# Patient Record
Sex: FEMALE | Race: WHITE
[De-identification: names, ages, dates, MRNs, and addresses within clinical notes are randomized per-mention and may not be internally consistent; named-entity substitution may affect disease eponyms.]

---

## 2022-12-30 VITALS
HEART RATE: 78 BPM | WEIGHT: 147.6 LBS | DIASTOLIC BLOOD PRESSURE: 56 MMHG | SYSTOLIC BLOOD PRESSURE: 84 MMHG | TEMPERATURE: 97.9 F | RESPIRATION RATE: 15 BRPM | OXYGEN SATURATION: 99 %

## 2023-04-03 ENCOUNTER — NON-APPOINTMENT (OUTPATIENT)
Age: 43
End: 2023-04-03

## 2023-04-03 DIAGNOSIS — Z98.890 OTHER SPECIFIED POSTPROCEDURAL STATES: ICD-10-CM

## 2023-04-03 DIAGNOSIS — Z82.49 FAMILY HISTORY OF ISCHEMIC HEART DISEASE AND OTHER DISEASES OF THE CIRCULATORY SYSTEM: ICD-10-CM

## 2023-04-03 DIAGNOSIS — F31.9 BIPOLAR DISORDER, UNSPECIFIED: ICD-10-CM

## 2023-04-03 DIAGNOSIS — Z78.9 OTHER SPECIFIED HEALTH STATUS: ICD-10-CM

## 2023-04-03 PROBLEM — Z00.00 ENCOUNTER FOR PREVENTIVE HEALTH EXAMINATION: Status: ACTIVE | Noted: 2023-04-03

## 2023-04-03 RX ORDER — NORETHINDRONE ACETATE AND ETHINYL ESTRADIOL, ETHINYL ESTRADIOL AND FERROUS FUMARATE 1MG-10(24)
1 MG-10 MCG / KIT ORAL DAILY
Refills: 0 | Status: ACTIVE | COMMUNITY

## 2023-04-03 RX ORDER — NALTREXONE HYDROCHLORIDE 50 MG/1
50 TABLET, FILM COATED ORAL DAILY
Refills: 0 | Status: ACTIVE | COMMUNITY

## 2023-04-21 ENCOUNTER — APPOINTMENT (OUTPATIENT)
Dept: INTERNAL MEDICINE | Facility: CLINIC | Age: 43
End: 2023-04-21
Payer: SELF-PAY

## 2023-04-21 ENCOUNTER — LABORATORY RESULT (OUTPATIENT)
Age: 43
End: 2023-04-21

## 2023-04-21 VITALS
BODY MASS INDEX: 23.09 KG/M2 | RESPIRATION RATE: 17 BRPM | DIASTOLIC BLOOD PRESSURE: 60 MMHG | SYSTOLIC BLOOD PRESSURE: 98 MMHG | WEIGHT: 147.13 LBS | HEIGHT: 67 IN | HEART RATE: 78 BPM | TEMPERATURE: 98 F

## 2023-04-21 PROCEDURE — 99214 OFFICE O/P EST MOD 30 MIN: CPT

## 2023-04-21 RX ORDER — BENZONATATE 100 MG/1
100 CAPSULE ORAL
Qty: 90 | Refills: 0 | Status: ACTIVE | COMMUNITY
Start: 2023-04-21 | End: 1900-01-01

## 2023-04-21 NOTE — ASSESSMENT
[FreeTextEntry1] : Probable viral sx\par Additional labs and CXR\par Reviewed labs from Dr castor -   Nl CMP Nl thyroid and CBC Platelet ct 451\par Inc po fluids\par Hormone testing w/ GYN\par Add tessalon perles\par All questions answered\par Re check 2 weeks

## 2023-04-21 NOTE — HISTORY OF PRESENT ILLNESS
[FreeTextEntry1] : C/O  Loss of appetite, fatigue and nausea  since 4/10\par Night sweats x 2 weeks\par Dry Cough x 1 week - no medications taken \par Saw GYN 2 days and had Nl eval\par Seen at [facility] last week and noted fever - nl eval\par No fever  up to 100 No chills\par Denies SOB/ WHEEZING, FEVER /CHILLS/ HA/V/D\par Appetite decreased Awakens 2 x per night\par Saw psych last week and had labs\par No additional complaints\par Daily medications reviewed: \par Clozapine 25mg 1 tab NIGHTLY\par Lithium 450mg 2 tab NIGHTLY\par Wellbutrin XL 300mg ER 1 tab in AM\par Lo-Loestrin FE 1-10MCG 1 tab Daily  \par Naltrexone 50mg 1 tab DAILY  [de-identified] : Night sweats

## 2023-04-26 LAB
ALBUMIN MFR SERPL ELPH: 44.2 %
ALBUMIN SERPL-MCNC: 3 G/DL
ALBUMIN/GLOB SERPL: 0.8 RATIO
ALPHA1 GLOB MFR SERPL ELPH: 10.1 %
ALPHA1 GLOB SERPL ELPH-MCNC: 0.7 G/DL
ALPHA2 GLOB MFR SERPL ELPH: 19.1 %
ALPHA2 GLOB SERPL ELPH-MCNC: 1.3 G/DL
B-GLOBULIN MFR SERPL ELPH: 13.7 %
B-GLOBULIN SERPL ELPH-MCNC: 0.9 G/DL
ERYTHROCYTE [SEDIMENTATION RATE] IN BLOOD BY WESTERGREN METHOD: 82 MM/HR
GAMMA GLOB FLD ELPH-MCNC: 0.9 G/DL
GAMMA GLOB MFR SERPL ELPH: 12.9 %
INTERPRETATION SERPL IEP-IMP: NORMAL
KAPPA LC 24H UR QL: NORMAL
M TB IFN-G BLD-IMP: ABNORMAL
PROT SERPL-MCNC: 6.8 G/DL
PROT SERPL-MCNC: 6.8 G/DL
QUANTIFERON TB PLUS MITOGEN MINUS NIL: 0.05 IU/ML
QUANTIFERON TB PLUS NIL: 0.01 IU/ML
QUANTIFERON TB PLUS TB1 MINUS NIL: 0 IU/ML
QUANTIFERON TB PLUS TB2 MINUS NIL: 0 IU/ML

## 2023-05-12 ENCOUNTER — APPOINTMENT (OUTPATIENT)
Dept: INTERNAL MEDICINE | Facility: CLINIC | Age: 43
End: 2023-05-12

## 2023-06-09 ENCOUNTER — APPOINTMENT (OUTPATIENT)
Dept: INTERNAL MEDICINE | Facility: CLINIC | Age: 43
End: 2023-06-09
Payer: COMMERCIAL

## 2023-06-09 VITALS
DIASTOLIC BLOOD PRESSURE: 70 MMHG | OXYGEN SATURATION: 96 % | TEMPERATURE: 98.1 F | WEIGHT: 148.6 LBS | HEART RATE: 80 BPM | HEIGHT: 65 IN | SYSTOLIC BLOOD PRESSURE: 98 MMHG | BODY MASS INDEX: 24.76 KG/M2 | RESPIRATION RATE: 16 BRPM

## 2023-06-09 DIAGNOSIS — R51.9 HEADACHE, UNSPECIFIED: ICD-10-CM

## 2023-06-09 DIAGNOSIS — G89.29 HEADACHE, UNSPECIFIED: ICD-10-CM

## 2023-06-09 DIAGNOSIS — R61 GENERALIZED HYPERHIDROSIS: ICD-10-CM

## 2023-06-09 PROCEDURE — 99213 OFFICE O/P EST LOW 20 MIN: CPT

## 2023-06-09 NOTE — PHYSICAL EXAM
[No Acute Distress] : no acute distress [EOMI] : extraocular movements intact [Normal TMs] : both tympanic membranes were normal [No Lymphadenopathy] : no lymphadenopathy [No Respiratory Distress] : no respiratory distress  [Normal Rate] : normal rate  [Regular Rhythm] : with a regular rhythm [No Edema] : there was no peripheral edema [Soft] : abdomen soft [Non Tender] : non-tender [Normal Bowel Sounds] : normal bowel sounds [No Rash] : no rash [No Focal Deficits] : no focal deficits [de-identified] : Hand tremors

## 2023-06-09 NOTE — REVIEW OF SYSTEMS
[Fever] : no fever [Pain] : no pain [Itching] : no itching [Hearing Loss] : no hearing loss [Sore Throat] : no sore throat [Chest Pain] : no chest pain [Palpitations] : no palpitations [Shortness Of Breath] : no shortness of breath [Wheezing] : no wheezing [Cough] : no cough [Nausea] : no nausea [Constipation] : no constipation [Diarrhea] : diarrhea [Vomiting] : no vomiting [Incontinence] : no incontinence [Frequency] : no frequency [Itching] : no itching [Skin Rash] : no skin rash [FreeTextEntry4] : ringing in ears bilat [de-identified] : hand tremors

## 2023-06-09 NOTE — ASSESSMENT
[FreeTextEntry1] : No change w/tx\par consider Neuro eval for tremors\par All questions answered\par Re check 3 months

## 2023-06-09 NOTE — HISTORY OF PRESENT ILLNESS
[FreeTextEntry1] : Pt here for follow up - rx mgmt\par denies any sob/wheezing/n/v/d/ha/chest pain/chest palpations and dizziness \par no fever chills cough wheeze\par Nll energy\par reviewed labs w/ patient\par Ringing in ears and shaky hands felt due to rx's\par No complaints [de-identified] : Anxiety\par Shaky hands\par Ringing in ears

## 2023-10-30 RX ORDER — ZOLMITRIPTAN 2.5 MG/1
2.5 SPRAY, METERED NASAL DAILY
Qty: 9 | Refills: 0 | Status: ACTIVE | COMMUNITY
Start: 2023-10-30 | End: 1900-01-01

## 2023-10-30 RX ORDER — RIZATRIPTAN BENZOATE 10 MG/1
10 TABLET ORAL
Refills: 0 | Status: DISCONTINUED | COMMUNITY
End: 2023-10-30

## 2023-12-15 ENCOUNTER — APPOINTMENT (OUTPATIENT)
Dept: INTERNAL MEDICINE | Facility: CLINIC | Age: 43
End: 2023-12-15
Payer: COMMERCIAL

## 2023-12-15 VITALS
DIASTOLIC BLOOD PRESSURE: 68 MMHG | TEMPERATURE: 97.7 F | BODY MASS INDEX: 25.66 KG/M2 | HEART RATE: 84 BPM | RESPIRATION RATE: 18 BRPM | WEIGHT: 154 LBS | OXYGEN SATURATION: 98 % | SYSTOLIC BLOOD PRESSURE: 101 MMHG | HEIGHT: 65 IN

## 2023-12-15 VITALS — DIASTOLIC BLOOD PRESSURE: 70 MMHG | SYSTOLIC BLOOD PRESSURE: 96 MMHG

## 2023-12-15 DIAGNOSIS — G43.909 MIGRAINE, UNSPECIFIED, NOT INTRACTABLE, W/OUT STATUS MIGRAINOSUS: ICD-10-CM

## 2023-12-15 DIAGNOSIS — R25.1 TREMOR, UNSPECIFIED: ICD-10-CM

## 2023-12-15 PROCEDURE — 99213 OFFICE O/P EST LOW 20 MIN: CPT

## 2023-12-15 NOTE — REVIEW OF SYSTEMS
[Fever] : no fever [Chills] : no chills [Fatigue] : no fatigue [Pain] : no pain [Itching] : no itching [Hearing Loss] : no hearing loss [Hoarseness] : no hoarseness [Nasal Discharge] : no nasal discharge [Sore Throat] : no sore throat [Postnasal Drip] : no postnasal drip [Chest Pain] : no chest pain [Palpitations] : no palpitations [Shortness Of Breath] : no shortness of breath [Wheezing] : no wheezing [Cough] : no cough [Abdominal Pain] : no abdominal pain [Constipation] : no constipation [Vomiting] : no vomiting [Heartburn] : no heartburn [Incontinence] : no incontinence [Frequency] : no frequency [Joint Pain] : no joint pain [Skin Rash] : no skin rash [Headache] : no headache [Dizziness] : no dizziness

## 2023-12-15 NOTE — PHYSICAL EXAM
[No Acute Distress] : no acute distress [Well Developed] : well developed [EOMI] : extraocular movements intact [Normal TMs] : both tympanic membranes were normal [No Lymphadenopathy] : no lymphadenopathy [No Respiratory Distress] : no respiratory distress  [Clear to Auscultation] : lungs were clear to auscultation bilaterally [Normal Rate] : normal rate  [Regular Rhythm] : with a regular rhythm [No Edema] : there was no peripheral edema [Soft] : abdomen soft [Non Tender] : non-tender [Normal Bowel Sounds] : normal bowel sounds [No CVA Tenderness] : no CVA  tenderness [No Joint Swelling] : no joint swelling [No Rash] : no rash

## 2023-12-15 NOTE — HISTORY OF PRESENT ILLNESS
[FreeTextEntry1] : Still has migraines  Intol to Maxalt - GI upset, was unable to obtain Zomig No issues w/ vision or hearing Wants weight loss Gained 5 lbs No additional complaints Wants to tr Nurtec  [de-identified] : Migraines

## 2023-12-15 NOTE — ASSESSMENT
[FreeTextEntry1] : Will try Neurtec 75 mg qod - prophylaxis Considering Wt loss Rx - Ozempic Recc optho F/up - last seen 5/2023 All questions answered Re check 6 weeks

## 2023-12-18 PROBLEM — G43.909 MIGRAINES: Status: ACTIVE | Noted: 2023-09-08

## 2023-12-18 PROBLEM — R25.1 TREMORS OF NERVOUS SYSTEM: Status: ACTIVE | Noted: 2023-06-09

## 2023-12-22 RX ORDER — RIMEGEPANT SULFATE 75 MG/75MG
75 TABLET, ORALLY DISINTEGRATING ORAL
Qty: 15 | Refills: 0 | Status: ACTIVE | COMMUNITY
Start: 2023-12-15 | End: 1900-01-01

## 2024-01-22 RX ORDER — RIZATRIPTAN BENZOATE 10 MG/1
10 TABLET ORAL
Qty: 30 | Refills: 1 | Status: ACTIVE | COMMUNITY
Start: 2024-01-22 | End: 1900-01-01

## 2025-03-21 ENCOUNTER — APPOINTMENT (OUTPATIENT)
Dept: INTERNAL MEDICINE | Facility: CLINIC | Age: 45
End: 2025-03-21
Payer: COMMERCIAL

## 2025-03-21 VITALS
OXYGEN SATURATION: 97 % | SYSTOLIC BLOOD PRESSURE: 111 MMHG | BODY MASS INDEX: 24.16 KG/M2 | TEMPERATURE: 97.5 F | WEIGHT: 145 LBS | HEART RATE: 77 BPM | DIASTOLIC BLOOD PRESSURE: 74 MMHG | HEIGHT: 65 IN | RESPIRATION RATE: 18 BRPM

## 2025-03-21 DIAGNOSIS — N30.00 ACUTE CYSTITIS W/OUT HEMATURIA: ICD-10-CM

## 2025-03-21 DIAGNOSIS — F31.9 BIPOLAR DISORDER, UNSPECIFIED: ICD-10-CM

## 2025-03-21 PROCEDURE — 99214 OFFICE O/P EST MOD 30 MIN: CPT

## 2025-03-21 RX ORDER — CLOZAPINE 25 MG/1
25 TABLET ORAL DAILY
Qty: 90 | Refills: 0 | Status: ACTIVE | COMMUNITY
Start: 2025-03-21

## 2025-03-21 RX ORDER — BUPROPION HYDROCHLORIDE 300 MG/1
300 TABLET, EXTENDED RELEASE ORAL
Qty: 90 | Refills: 1 | Status: ACTIVE | COMMUNITY
Start: 2025-03-21

## 2025-03-21 RX ORDER — SULFAMETHOXAZOLE AND TRIMETHOPRIM 800; 160 MG/1; MG/1
800-160 TABLET ORAL TWICE DAILY
Qty: 6 | Refills: 1 | Status: ACTIVE | COMMUNITY
Start: 2025-03-21 | End: 1900-01-01

## 2025-03-21 RX ORDER — LITHIUM CARBONATE 450 MG/1
450 TABLET ORAL
Qty: 60 | Refills: 0 | Status: ACTIVE | COMMUNITY
Start: 2025-03-21

## 2025-03-21 RX ORDER — CLOZAPINE 200 MG/1
200 TABLET ORAL DAILY
Qty: 90 | Refills: 0 | Status: ACTIVE | COMMUNITY
Start: 2025-03-21

## 2025-03-24 ENCOUNTER — LABORATORY RESULT (OUTPATIENT)
Age: 45
End: 2025-03-24

## 2025-03-25 LAB
APPEARANCE: ABNORMAL
BILIRUBIN URINE: NORMAL
BLOOD URINE: NORMAL
COLOR: ABNORMAL
GLUCOSE QUALITATIVE U: NORMAL
KETONES URINE: NORMAL
LEUKOCYTE ESTERASE URINE: NORMAL
NITRITE URINE: NORMAL
PH URINE: NORMAL
PROTEIN URINE: NORMAL
SPECIFIC GRAVITY URINE: NORMAL
UROBILINOGEN URINE: NORMAL

## 2025-03-27 LAB — BACTERIA UR CULT: ABNORMAL

## 2025-06-07 ENCOUNTER — APPOINTMENT (OUTPATIENT)
Dept: INTERNAL MEDICINE | Facility: CLINIC | Age: 45
End: 2025-06-07